# Patient Record
Sex: FEMALE | Race: WHITE | Employment: STUDENT | URBAN - METROPOLITAN AREA
[De-identification: names, ages, dates, MRNs, and addresses within clinical notes are randomized per-mention and may not be internally consistent; named-entity substitution may affect disease eponyms.]

---
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
## 2021-05-10 PROBLEM — N92.6 IRREGULAR MENSTRUAL BLEEDING: Status: ACTIVE | Noted: 2021-05-10

## (undated) NOTE — ED AVS SNAPSHOT
[facility] Emergency Department    [place] [address]    Phone:  [phone]    Fax:  [address]   MRN: [id number]    Department:  [facility] Emergency Department   Date of Visit:  1/ IF THERE IS ANY CHANGE OR WORSENING OF YOUR CONDITION, CALL YOUR PRIMARY CARE PHYSICIAN AT ONCE OR RETURN IMMEDIATELY TO THE EMERGENCY DEPARTMENT.     If you have been prescribed any medication(s), please fill your prescription right away and begin taking t

## (undated) NOTE — ED AVS SNAPSHOT
Anayeli Fung   MRN: [id number]    Department:  [facility] Emergency Department   Date of Visit:  1/5/2018           Disclosure     Insurance plans vary and the physician(s) referred by the ER may not be covered by your plan.  Please contact yo tell this physician (or your personal doctor if your instructions are to return to your personal doctor) about any new or lasting problems. The primary care or specialist physician will see patients referred from the [facility] Emergency Department.  Janette Maravilla

## (undated) NOTE — ED AVS SNAPSHOT
[facility] Emergency Department    [place] [address]    Phone:  [phone]    Fax:  [address]   MRN: [id number]    Department:  [facility] Emergency Department   Date of Visit:  1/ Si usted tiene algun problema con juarez sequimiento, por favor llame a nuestro adminstrador de moriahos al [phone]    Expect to receive an electronic request (by e-mail or text) to complete a self-assessment the day after your visit.   You may also receiv [facility] [address] ([address]) [address]  ([address]) [address] [facility]. ([address]) [address] [facility] [address] (8 PROCEDURE:  XR FINGER(S) (MIN 2 VIEWS), RIGHT 5TH (CPT=73140)     INDICATIONS:  rt 5 th finger injury     COMPARISON:  None. TECHNIQUE:  Three views of the finger were obtained.      PATIENT STATED HISTORY:  Patient injured right fifth digit in color g

## (undated) NOTE — Clinical Note
I had the pleasure of seeing Isabell Damon on 9/11/2017. Please see my attached note.   Armida Lundborg, MD FACS [facility]Surgery

## (undated) NOTE — ED AVS SNAPSHOT
Madiha Henriquez   MRN: [id number]    Department:  [facility] Emergency Department   Date of Visit:  8/17/2018           Disclosure     Insurance plans vary and the physician(s) referred by the ER may not be covered by your plan.  Please contact y tell this physician (or your personal doctor if your instructions are to return to your personal doctor) about any new or lasting problems. The primary care or specialist physician will see patients referred from the [facility] Emergency Department.  Sj Osborn

## (undated) NOTE — LETTER
January 21, 2017    Patient: Jenna Jarrett [facility]   Date of Visit: 1/21/2017       To Whom It May Concern: Jerald Mckeon was seen and treated in our emergency department on 1/21/2017. She should not participate in gym/sports until one week.     If you